# Patient Record
Sex: MALE | Race: OTHER | HISPANIC OR LATINO | URBAN - METROPOLITAN AREA
[De-identification: names, ages, dates, MRNs, and addresses within clinical notes are randomized per-mention and may not be internally consistent; named-entity substitution may affect disease eponyms.]

---

## 2021-09-27 ENCOUNTER — LAB VISIT (OUTPATIENT)
Dept: PRIMARY CARE CLINIC | Facility: OTHER | Age: 31
End: 2021-09-27
Payer: OTHER GOVERNMENT

## 2021-09-27 DIAGNOSIS — Z20.822 ENCOUNTER FOR LABORATORY TESTING FOR COVID-19 VIRUS: ICD-10-CM

## 2021-09-27 PROCEDURE — U0003 INFECTIOUS AGENT DETECTION BY NUCLEIC ACID (DNA OR RNA); SEVERE ACUTE RESPIRATORY SYNDROME CORONAVIRUS 2 (SARS-COV-2) (CORONAVIRUS DISEASE [COVID-19]), AMPLIFIED PROBE TECHNIQUE, MAKING USE OF HIGH THROUGHPUT TECHNOLOGIES AS DESCRIBED BY CMS-2020-01-R: HCPCS | Performed by: INTERNAL MEDICINE

## 2021-09-28 LAB
SARS-COV-2 RNA RESP QL NAA+PROBE: NOT DETECTED
SARS-COV-2- CYCLE NUMBER: NORMAL

## 2021-10-05 ENCOUNTER — LAB VISIT (OUTPATIENT)
Dept: PRIMARY CARE CLINIC | Facility: OTHER | Age: 31
End: 2021-10-05
Attending: INTERNAL MEDICINE
Payer: OTHER GOVERNMENT

## 2021-10-05 DIAGNOSIS — Z20.822 ENCOUNTER FOR LABORATORY TESTING FOR COVID-19 VIRUS: ICD-10-CM

## 2021-10-05 PROCEDURE — U0003 INFECTIOUS AGENT DETECTION BY NUCLEIC ACID (DNA OR RNA); SEVERE ACUTE RESPIRATORY SYNDROME CORONAVIRUS 2 (SARS-COV-2) (CORONAVIRUS DISEASE [COVID-19]), AMPLIFIED PROBE TECHNIQUE, MAKING USE OF HIGH THROUGHPUT TECHNOLOGIES AS DESCRIBED BY CMS-2020-01-R: HCPCS | Performed by: INTERNAL MEDICINE

## 2021-10-06 LAB
SARS-COV-2 RNA RESP QL NAA+PROBE: NOT DETECTED
SARS-COV-2- CYCLE NUMBER: NORMAL

## 2021-11-03 ENCOUNTER — IMMUNIZATION (OUTPATIENT)
Dept: INTERNAL MEDICINE | Facility: CLINIC | Age: 31
End: 2021-11-03
Payer: OTHER GOVERNMENT

## 2021-11-03 DIAGNOSIS — Z23 NEED FOR VACCINATION: Primary | ICD-10-CM

## 2021-11-03 PROCEDURE — 0004A COVID-19, MRNA, LNP-S, PF, 30 MCG/0.3 ML DOSE VACCINE: CPT | Mod: PBBFAC

## 2024-04-15 ENCOUNTER — OFFICE VISIT (OUTPATIENT)
Dept: OPTOMETRY | Facility: CLINIC | Age: 34
End: 2024-04-15
Payer: COMMERCIAL

## 2024-04-15 DIAGNOSIS — H04.123 DRY EYE SYNDROME OF BOTH EYES: Primary | ICD-10-CM

## 2024-04-15 DIAGNOSIS — H52.203 MYOPIA OF BOTH EYES WITH ASTIGMATISM: ICD-10-CM

## 2024-04-15 DIAGNOSIS — Z46.0 FITTING AND ADJUSTMENT OF SPECTACLES AND CONTACT LENSES: Primary | ICD-10-CM

## 2024-04-15 DIAGNOSIS — H52.13 MYOPIA OF BOTH EYES WITH ASTIGMATISM: ICD-10-CM

## 2024-04-15 PROCEDURE — 92015 DETERMINE REFRACTIVE STATE: CPT | Mod: S$GLB,,, | Performed by: OPTOMETRIST

## 2024-04-15 PROCEDURE — 1160F RVW MEDS BY RX/DR IN RCRD: CPT | Mod: CPTII,S$GLB,, | Performed by: OPTOMETRIST

## 2024-04-15 PROCEDURE — 92004 COMPRE OPH EXAM NEW PT 1/>: CPT | Mod: S$GLB,,, | Performed by: OPTOMETRIST

## 2024-04-15 PROCEDURE — 99999 PR PBB SHADOW E&M-EST. PATIENT-LVL I: CPT | Mod: PBBFAC,,, | Performed by: OPTOMETRIST

## 2024-04-15 PROCEDURE — 92310 CONTACT LENS FITTING OU: CPT | Mod: CSM,S$GLB,, | Performed by: OPTOMETRIST

## 2024-04-15 PROCEDURE — 1159F MED LIST DOCD IN RCRD: CPT | Mod: CPTII,S$GLB,, | Performed by: OPTOMETRIST

## 2024-04-15 NOTE — PROGRESS NOTES
[place]    JOANNA: April 2023  Chief complaint (CC): 35 yo M here for routine eye exam and to update   glasses and contact lens Rx. Pt denies any other ocular or visual   complaints today.   Glasses? +  Contacts? +  H/o eye surgery, injections or laser: -  H/o eye injury: -  Known eye conditions? -  Family h/o eye conditions? -  Eye gtts? -      (-) Flashes (-)  Floaters (-) Mucous   (-)  Tearing (-) Itching (-) Burning   (-) Headaches (-) Eye Pain/discomfort (-) Irritation   (-)  Redness (-) Double vision (-) Blurry vision    Diabetic? -  A1c? -  Last edited by Gerard Contreras, OD on 4/15/2024  9:52 AM.            Assessment /Plan     For exam results, see Encounter Report.      Dry eye syndrome of both eyes   - Minimal. Asymptomatic. Monitor.     Myopia of both eyes with astigmatism   - New Spectacle and CL Rx given.   - RTC 1 year for routine eye exam.

## 2024-04-23 ENCOUNTER — PATIENT MESSAGE (OUTPATIENT)
Dept: OPTOMETRY | Facility: CLINIC | Age: 34
End: 2024-04-23
Payer: COMMERCIAL

## 2024-04-25 ENCOUNTER — LAB VISIT (OUTPATIENT)
Dept: LAB | Facility: OTHER | Age: 34
End: 2024-04-25
Attending: STUDENT IN AN ORGANIZED HEALTH CARE EDUCATION/TRAINING PROGRAM
Payer: COMMERCIAL

## 2024-04-25 ENCOUNTER — OFFICE VISIT (OUTPATIENT)
Dept: INTERNAL MEDICINE | Facility: CLINIC | Age: 34
End: 2024-04-25
Payer: COMMERCIAL

## 2024-04-25 VITALS
DIASTOLIC BLOOD PRESSURE: 80 MMHG | OXYGEN SATURATION: 97 % | SYSTOLIC BLOOD PRESSURE: 112 MMHG | BODY MASS INDEX: 29.32 KG/M2 | WEIGHT: 204.81 LBS | HEART RATE: 82 BPM | HEIGHT: 70 IN

## 2024-04-25 DIAGNOSIS — Z00.00 WELLNESS EXAMINATION: ICD-10-CM

## 2024-04-25 DIAGNOSIS — Z11.59 NEED FOR HEPATITIS C SCREENING TEST: ICD-10-CM

## 2024-04-25 DIAGNOSIS — Z11.4 SCREENING FOR HIV (HUMAN IMMUNODEFICIENCY VIRUS): ICD-10-CM

## 2024-04-25 DIAGNOSIS — Z00.00 WELLNESS EXAMINATION: Primary | ICD-10-CM

## 2024-04-25 LAB
ALBUMIN SERPL BCP-MCNC: 4.4 G/DL (ref 3.5–5.2)
ALP SERPL-CCNC: 80 U/L (ref 55–135)
ALT SERPL W/O P-5'-P-CCNC: 25 U/L (ref 10–44)
ANION GAP SERPL CALC-SCNC: 7 MMOL/L (ref 8–16)
AST SERPL-CCNC: 20 U/L (ref 10–40)
BASOPHILS # BLD AUTO: 0.03 K/UL (ref 0–0.2)
BASOPHILS NFR BLD: 0.8 % (ref 0–1.9)
BILIRUB SERPL-MCNC: 1.5 MG/DL (ref 0.1–1)
BUN SERPL-MCNC: 13 MG/DL (ref 6–20)
CALCIUM SERPL-MCNC: 9.8 MG/DL (ref 8.7–10.5)
CHLORIDE SERPL-SCNC: 103 MMOL/L (ref 95–110)
CHOLEST SERPL-MCNC: 182 MG/DL (ref 120–199)
CHOLEST/HDLC SERPL: 2.9 {RATIO} (ref 2–5)
CO2 SERPL-SCNC: 29 MMOL/L (ref 23–29)
CREAT SERPL-MCNC: 0.9 MG/DL (ref 0.5–1.4)
DIFFERENTIAL METHOD BLD: ABNORMAL
EOSINOPHIL # BLD AUTO: 0 K/UL (ref 0–0.5)
EOSINOPHIL NFR BLD: 1.1 % (ref 0–8)
ERYTHROCYTE [DISTWIDTH] IN BLOOD BY AUTOMATED COUNT: 12.3 % (ref 11.5–14.5)
EST. GFR  (NO RACE VARIABLE): >60 ML/MIN/1.73 M^2
ESTIMATED AVG GLUCOSE: 100 MG/DL (ref 68–131)
GLUCOSE SERPL-MCNC: 97 MG/DL (ref 70–110)
HBA1C MFR BLD: 5.1 % (ref 4–5.6)
HCT VFR BLD AUTO: 44.7 % (ref 40–54)
HCV AB SERPL QL IA: NEGATIVE
HDLC SERPL-MCNC: 62 MG/DL (ref 40–75)
HDLC SERPL: 34.1 % (ref 20–50)
HGB BLD-MCNC: 15.3 G/DL (ref 14–18)
HIV 1+2 AB+HIV1 P24 AG SERPL QL IA: NEGATIVE
IMM GRANULOCYTES # BLD AUTO: 0.01 K/UL (ref 0–0.04)
IMM GRANULOCYTES NFR BLD AUTO: 0.3 % (ref 0–0.5)
LDLC SERPL CALC-MCNC: 107 MG/DL (ref 63–159)
LYMPHOCYTES # BLD AUTO: 1.3 K/UL (ref 1–4.8)
LYMPHOCYTES NFR BLD: 37.2 % (ref 18–48)
MCH RBC QN AUTO: 30.2 PG (ref 27–31)
MCHC RBC AUTO-ENTMCNC: 34.2 G/DL (ref 32–36)
MCV RBC AUTO: 88 FL (ref 82–98)
MONOCYTES # BLD AUTO: 0.3 K/UL (ref 0.3–1)
MONOCYTES NFR BLD: 8.7 % (ref 4–15)
NEUTROPHILS # BLD AUTO: 1.9 K/UL (ref 1.8–7.7)
NEUTROPHILS NFR BLD: 51.9 % (ref 38–73)
NONHDLC SERPL-MCNC: 120 MG/DL
NRBC BLD-RTO: 0 /100 WBC
PLATELET # BLD AUTO: 204 K/UL (ref 150–450)
PMV BLD AUTO: 10.2 FL (ref 9.2–12.9)
POTASSIUM SERPL-SCNC: 4.2 MMOL/L (ref 3.5–5.1)
PROT SERPL-MCNC: 7.7 G/DL (ref 6–8.4)
RBC # BLD AUTO: 5.06 M/UL (ref 4.6–6.2)
SODIUM SERPL-SCNC: 139 MMOL/L (ref 136–145)
TRIGL SERPL-MCNC: 65 MG/DL (ref 30–150)
WBC # BLD AUTO: 3.58 K/UL (ref 3.9–12.7)

## 2024-04-25 PROCEDURE — 83036 HEMOGLOBIN GLYCOSYLATED A1C: CPT | Performed by: STUDENT IN AN ORGANIZED HEALTH CARE EDUCATION/TRAINING PROGRAM

## 2024-04-25 PROCEDURE — 36415 COLL VENOUS BLD VENIPUNCTURE: CPT | Performed by: STUDENT IN AN ORGANIZED HEALTH CARE EDUCATION/TRAINING PROGRAM

## 2024-04-25 PROCEDURE — 86803 HEPATITIS C AB TEST: CPT | Performed by: STUDENT IN AN ORGANIZED HEALTH CARE EDUCATION/TRAINING PROGRAM

## 2024-04-25 PROCEDURE — 80061 LIPID PANEL: CPT | Performed by: STUDENT IN AN ORGANIZED HEALTH CARE EDUCATION/TRAINING PROGRAM

## 2024-04-25 PROCEDURE — 85025 COMPLETE CBC W/AUTO DIFF WBC: CPT | Performed by: STUDENT IN AN ORGANIZED HEALTH CARE EDUCATION/TRAINING PROGRAM

## 2024-04-25 PROCEDURE — 3008F BODY MASS INDEX DOCD: CPT | Mod: CPTII,S$GLB,, | Performed by: STUDENT IN AN ORGANIZED HEALTH CARE EDUCATION/TRAINING PROGRAM

## 2024-04-25 PROCEDURE — 87389 HIV-1 AG W/HIV-1&-2 AB AG IA: CPT | Performed by: STUDENT IN AN ORGANIZED HEALTH CARE EDUCATION/TRAINING PROGRAM

## 2024-04-25 PROCEDURE — 1160F RVW MEDS BY RX/DR IN RCRD: CPT | Mod: CPTII,S$GLB,, | Performed by: STUDENT IN AN ORGANIZED HEALTH CARE EDUCATION/TRAINING PROGRAM

## 2024-04-25 PROCEDURE — 99385 PREV VISIT NEW AGE 18-39: CPT | Mod: S$GLB,,, | Performed by: STUDENT IN AN ORGANIZED HEALTH CARE EDUCATION/TRAINING PROGRAM

## 2024-04-25 PROCEDURE — 3074F SYST BP LT 130 MM HG: CPT | Mod: CPTII,S$GLB,, | Performed by: STUDENT IN AN ORGANIZED HEALTH CARE EDUCATION/TRAINING PROGRAM

## 2024-04-25 PROCEDURE — 80053 COMPREHEN METABOLIC PANEL: CPT | Performed by: STUDENT IN AN ORGANIZED HEALTH CARE EDUCATION/TRAINING PROGRAM

## 2024-04-25 PROCEDURE — 3079F DIAST BP 80-89 MM HG: CPT | Mod: CPTII,S$GLB,, | Performed by: STUDENT IN AN ORGANIZED HEALTH CARE EDUCATION/TRAINING PROGRAM

## 2024-04-25 PROCEDURE — 1159F MED LIST DOCD IN RCRD: CPT | Mod: CPTII,S$GLB,, | Performed by: STUDENT IN AN ORGANIZED HEALTH CARE EDUCATION/TRAINING PROGRAM

## 2024-04-25 PROCEDURE — 99999 PR PBB SHADOW E&M-EST. PATIENT-LVL III: CPT | Mod: PBBFAC,,, | Performed by: STUDENT IN AN ORGANIZED HEALTH CARE EDUCATION/TRAINING PROGRAM

## 2024-04-25 NOTE — PROGRESS NOTES
"Subjective:       Patient ID: Arben Torres is a 34 y.o. male.    Chief Complaint: Health Maintenance    HPI  Check up. Last check up 1 year ago. No health changes since, no health concerns, no c/o anything. Diet: healthy to him. Exercise: 3 days of cardio and strength training, 1-1.5 hours. Smokes/Vape/illicit drug use: vapes THC daily for his chronic knee pain, and ADHD symptoms. Alcohol use: wine use daily, 2-3 glasses due to nature of his work as a . Medications/supplements: none daily.    All of your core healthy metrics are met.      Social History     Social History Narrative    Not on file       Family History   Problem Relation Name Age of Onset    Hypertension Mother      Cancer Paternal Aunt      Vision loss Maternal Grandmother      Stroke Maternal Grandmother      Hypertension Maternal Grandmother      Cataracts Maternal Grandmother      Cancer Paternal Grandmother       No current outpatient medications on file.    Review of Systems   Constitutional:  Negative for chills and fever.   Respiratory:  Negative for cough and shortness of breath.    Cardiovascular:  Negative for chest pain.   Gastrointestinal:  Negative for abdominal pain, nausea and vomiting.   Neurological:  Negative for weakness.       Objective:   /80 (BP Location: Right arm, Patient Position: Sitting, BP Method: Large (Manual))   Pulse 82   Ht 5' 10" (1.778 m)   Wt 92.9 kg (204 lb 12.9 oz)   SpO2 97%   BMI 29.39 kg/m²      Physical Exam  Vitals and nursing note reviewed.   Constitutional:       General: He is not in acute distress.     Appearance: Normal appearance. He is not ill-appearing.   Eyes:      General:         Right eye: No discharge.         Left eye: No discharge.      Conjunctiva/sclera: Conjunctivae normal.   Cardiovascular:      Rate and Rhythm: Normal rate and regular rhythm.   Pulmonary:      Effort: Pulmonary effort is normal. No respiratory distress.      Breath sounds: Normal breath sounds. No " wheezing.   Neurological:      Mental Status: He is alert.   Psychiatric:         Behavior: Behavior normal.         Assessment & Plan   1. Wellness examination  -Reviewed overdue HCM vaccines with patient today.  -Encouraged reduction of wine use if able while at work.  - CBC Auto Differential; Future  - Comprehensive Metabolic Panel; Future  - Hemoglobin A1C; Future  - Lipid Panel; Future    2. Need for hepatitis C screening test  - Hepatitis C Antibody; Future    3. Screening for HIV (human immunodeficiency virus)  - HIV 1/2 Ag/Ab (4th Gen); Future    Follow up in about 6 months (around 10/25/2024). Sooner pending labwork above.    Disclaimer:  This note may have been prepared using voice recognition software, it may have not been extensively proofed, as such there could be errors within the text such as sound alike errors.

## 2024-06-06 ENCOUNTER — PATIENT OUTREACH (OUTPATIENT)
Dept: ADMINISTRATIVE | Facility: HOSPITAL | Age: 34
End: 2024-06-06
Payer: COMMERCIAL

## 2024-06-06 NOTE — PROGRESS NOTES
Health Maintenance Due   Topic Date Due    Pneumococcal Vaccines (Age 0-64) (1 of 2 - PCV) Never done    TETANUS VACCINE  Never done    COVID-19 Vaccine (4 - 2023-24 season) 09/01/2023    Health Maintenance reviewed, updated and links triggered. HM'S up to date. ([facility]) 6/6/24     No

## 2024-06-13 ENCOUNTER — PATIENT MESSAGE (OUTPATIENT)
Dept: INTERNAL MEDICINE | Facility: CLINIC | Age: 34
End: 2024-06-13
Payer: COMMERCIAL

## 2024-10-25 ENCOUNTER — OFFICE VISIT (OUTPATIENT)
Dept: INTERNAL MEDICINE | Facility: CLINIC | Age: 34
End: 2024-10-25
Payer: COMMERCIAL

## 2024-10-25 VITALS
HEIGHT: 70 IN | WEIGHT: 199.94 LBS | OXYGEN SATURATION: 98 % | SYSTOLIC BLOOD PRESSURE: 124 MMHG | HEART RATE: 78 BPM | BODY MASS INDEX: 28.62 KG/M2 | DIASTOLIC BLOOD PRESSURE: 72 MMHG

## 2024-10-25 DIAGNOSIS — B35.1 ONYCHOMYCOSIS: ICD-10-CM

## 2024-10-25 DIAGNOSIS — Z23 NEED FOR COVID-19 VACCINE: Primary | ICD-10-CM

## 2024-10-25 DIAGNOSIS — Z23 NEED FOR TDAP VACCINATION: ICD-10-CM

## 2024-10-25 PROCEDURE — 99999 PR PBB SHADOW E&M-EST. PATIENT-LVL III: CPT | Mod: PBBFAC,,, | Performed by: STUDENT IN AN ORGANIZED HEALTH CARE EDUCATION/TRAINING PROGRAM

## 2024-10-25 NOTE — PROGRESS NOTES
TWO PATIENT IDENTIFIERS VERIFIED.  ALLERGIES VERIFIED.    After obtaining verbal consent from the patient, and per orders of Dr. NARAYAN, injection of COMIRNATY COVID-19 VACCINE LOT [id number] EXP 02025/04/04 given IM in the LEFT DELTOID by HANG MONACO LPN. Patient tolerated well and adhesive bandage applied. Patient instructed to remain in clinic for 15 minutes afterwards, and to report any adverse reaction to me immediately.          After obtaining verbal consent from the patient, and per orders of Dr. NARAYAN, injection of BOOSTRIX TDAP LOT [id number] EXP 10/12/26 , given IM in the RIGHT DELTOID by HANG MONACO LPN. Patient tolerated well and adhesive bandage applied. Patient instructed to remain in clinic for 15 minutes afterwards, and to report any adverse reaction to me immediately.

## 2024-10-25 NOTE — PROGRESS NOTES
"[facility]  Subjective:     Patient ID: Arben Torres is a 34 y.o. male.  History of Present Illness    CHIEF COMPLAINT:  Patient presents today to establish care.    GASTROINTESTINAL:  He reports experiencing acid reflux symptoms for the past few months, primarily occurring in the mornings and after consuming spicy foods. Symptoms include excessive burping and occasional regurgitation of bile upon waking. He manages symptoms by limiting spicy foods and taking Pepcid as needed, which he finds effective. He denies experiencing these symptoms prior to the past few months.    MEDICAL HISTORY:  He has a history of ADHD, diagnosed by a therapist and counselor a few years ago. He recently underwent oral surgery for the removal of wisdom teeth and four molars.    SUBSTANCE USE:  He works as a  and consumes 2-3 glasses of wine on workdays, which occur 4 days per week. He also drinks occasionally when dining out with his wife, but denies significant alcohol consumption at home outside of work-related activities. He reports vaping THC obtained exclusively from a dispensary for ADHD symptom management, stating it helps him focus and reduce mental distractions.    EXERCISE:  He exercises regularly, attending the gym 2-3 times per week. 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     DISCOLORATION OF TOENAILS   Requested referral to Podiatry    No current outpatient medications  Objective:      Body mass index is 28.69 kg/m².  Vitals:    10/25/24 0938   BP: 124/72   Pulse: 78   SpO2: 98%   Weight: 90.7 kg (199 lb 15.3 oz)   Height: 5' 10" (1.778 m)   PainSc: 0-No pain     Physical Exam   Physical Exam    General: No acute distress. Normal appearance.  Head: Normocephalic.  Eyes: Extraocular movements intact.  Cardiovascular: Normal rate and rhythm. No murmur heard.  Lungs: Pulmonary effort is normal. Normal breath sounds. No " wheezing. No rales.  Abdomen: Flat.  Musculoskeletal: No edema lower extremities.  Skin: Warm. Dry.  Neurological: Alert.  Psychiatric: Normal mood. Normal behavior.  Vitals: Blood pressure: Normal. Weight: Down 2 lbs.  Neck: No lymphadenopathy.       Assessment:       1. Need for COVID-19 vaccine    2. Need for Tdap vaccination    3. Onychomycosis        Plan:   Assessment & Plan    GASTROESOPHAGEAL REFLUX DISEASE (GERD):  - Assessed patient's reported acid reflux symptoms, determining they are consistent with common GERD.  - Concluded current management with Pepcid and dietary modifications is appropriate given symptom improvement and patient's age/risk factors.  - Explained that new-onset acid reflux in older adults may warrant endoscopy to rule out cancer, but this is not indicated in patient's case.  - Patient to continue limiting spicy foods to manage acid reflux symptoms.  - Continued Pepcid as needed for acid reflux symptoms.    ALCOHOL CONSUMPTION:  - Reviewed patient's alcohol consumption related to occupation as , noting it exceeds general recommendations   - Informed patient about medical recommendations for alcohol consumption (4-5 drinks per week).  - Patient to maintain awareness of alcohol consumption, particularly given occupational exposure.    MARIJUANA USE FOR SELF-REPORTED ADHD:  - Evaluated patient's use of marijuana for self-reported ADHD symptoms, determining current regimen from dispensary is acceptable.    LABS:  - Reviewed April labs, noting all results within normal limits except for borderline low white count, which warrants monitoring.  - Follow up in 1 year for repeat labs    VAPING:  - Advised on risks associated with off-brand vape products and potential for vaping-related lung injury.    COVID-19 VACCINATION:  - Administered Pfizer COVID-19 vaccine (updated version for individuals >12 years old).  - Discussed potential side effects of vaccinations, including arm soreness  and mild systemic symptoms.    TETANUS VACCINATION:  - Administered tetanus vaccine.         Need for COVID-19 vaccine  -     COVID-19 (Pfizer) 30 mcg/0.3 mL IM vaccine (>/= 13 yo) 0.3 mL    Need for Tdap vaccination  -     Tdap (BOOSTRIX) vaccine injection 0.5 mL    Onychomycosis  -     Ambulatory referral/consult to Podiatry; Future; Expected date: 11/01/2024        All of your core healthy metrics are met.    Follow up in about 1 year (around 10/25/2025). or sooner prn (as needed)          Schuyler Whalen  [facility]  [address]  [place] [address]  Phone [phone]  Fax [phone]    Part of this note is dictated using the M*Modal Fluency Direct word recognition program. It may contain word recognition mistakes or wrong word substitutions (commonly he/she and is/was substitutions) that were missed on review.    Part of this note was generated with the assistance of ambient listening technology. Verbal consent was obtained by the patient and accompanying visitor(s) for the recording of patient appointment to facilitate this note. I attest to having reviewed and edited the generated note for accuracy, though some syntax or spelling errors may persist. Please contact the author of this note for any clarification.

## 2024-11-01 ENCOUNTER — OFFICE VISIT (OUTPATIENT)
Dept: PODIATRY | Facility: CLINIC | Age: 34
End: 2024-11-01
Payer: COMMERCIAL

## 2024-11-01 VITALS
WEIGHT: 199.94 LBS | HEART RATE: 90 BPM | SYSTOLIC BLOOD PRESSURE: 128 MMHG | BODY MASS INDEX: 28.62 KG/M2 | DIASTOLIC BLOOD PRESSURE: 83 MMHG | HEIGHT: 70 IN

## 2024-11-01 DIAGNOSIS — B35.1 ONYCHOMYCOSIS: ICD-10-CM

## 2024-11-01 PROCEDURE — 99999 PR PBB SHADOW E&M-EST. PATIENT-LVL III: CPT | Mod: PBBFAC,,, | Performed by: PODIATRIST

## 2024-11-01 RX ORDER — CICLOPIROX 80 MG/ML
SOLUTION TOPICAL NIGHTLY
Qty: 6.6 ML | Refills: 11 | Status: SHIPPED | OUTPATIENT
Start: 2024-11-01

## 2025-05-11 ENCOUNTER — PATIENT MESSAGE (OUTPATIENT)
Dept: OPTOMETRY | Facility: CLINIC | Age: 35
End: 2025-05-11
Payer: COMMERCIAL